# Patient Record
Sex: MALE | Race: BLACK OR AFRICAN AMERICAN | NOT HISPANIC OR LATINO | Employment: FULL TIME | URBAN - NONMETROPOLITAN AREA
[De-identification: names, ages, dates, MRNs, and addresses within clinical notes are randomized per-mention and may not be internally consistent; named-entity substitution may affect disease eponyms.]

---

## 2024-01-22 ENCOUNTER — HOSPITAL ENCOUNTER (EMERGENCY)
Facility: HOSPITAL | Age: 34
Discharge: HOME | End: 2024-01-22

## 2024-01-22 VITALS
RESPIRATION RATE: 16 BRPM | HEART RATE: 82 BPM | SYSTOLIC BLOOD PRESSURE: 135 MMHG | WEIGHT: 221 LBS | DIASTOLIC BLOOD PRESSURE: 89 MMHG | OXYGEN SATURATION: 97 % | TEMPERATURE: 98 F | BODY MASS INDEX: 32.73 KG/M2 | HEIGHT: 69 IN

## 2024-01-22 DIAGNOSIS — T14.90XA INJURY: ICD-10-CM

## 2024-01-22 DIAGNOSIS — S60.011A CONTUSION OF RIGHT THUMB WITHOUT DAMAGE TO NAIL, INITIAL ENCOUNTER: Primary | ICD-10-CM

## 2024-01-22 PROCEDURE — 99284 EMERGENCY DEPT VISIT MOD MDM: CPT | Mod: ,,, | Performed by: NURSE PRACTITIONER

## 2024-01-22 PROCEDURE — 99283 EMERGENCY DEPT VISIT LOW MDM: CPT

## 2024-01-22 RX ORDER — NAPROXEN 500 MG/1
500 TABLET ORAL 2 TIMES DAILY WITH MEALS
Qty: 20 TABLET | Refills: 0 | Status: SHIPPED | OUTPATIENT
Start: 2024-01-22

## 2024-01-22 NOTE — ED PROVIDER NOTES
Encounter Date: 1/22/2024       History     Chief Complaint   Patient presents with    Hand Injury     Closed right thumb in car door approx 30 mins PTA     32 y/o AAM arrived to the ED via POV with complaint of right thumb injury that occurred 30 minutes PTA when car door shut on his thumb. Rates pain 10/10, pain worse with movement. Has not taken any medication for pain.    The history is provided by the patient.     Review of patient's allergies indicates:  No Known Allergies  History reviewed. No pertinent past medical history.  History reviewed. No pertinent surgical history.  Family History   Problem Relation Age of Onset    Diabetes Mother     Diabetes Sister     Diabetes Maternal Grandmother     Diabetes Paternal Grandmother      Social History     Tobacco Use    Smoking status: Never    Smokeless tobacco: Never   Substance Use Topics    Alcohol use: Never    Drug use: Never     Review of Systems   Constitutional: Negative.    HENT: Negative.     Eyes: Negative.    Respiratory: Negative.     Cardiovascular: Negative.    Gastrointestinal: Negative.    Genitourinary: Negative.    Musculoskeletal:  Positive for arthralgias (right thumb).   Skin: Negative.    Neurological: Negative.    Hematological: Negative.    Psychiatric/Behavioral: Negative.         Physical Exam     Initial Vitals [01/22/24 1139]   BP Pulse Resp Temp SpO2   135/89 82 16 97.9 °F (36.6 °C) 97 %      MAP       --         Physical Exam    Nursing note and vitals reviewed.  Constitutional: He appears well-developed and well-nourished.   Cardiovascular:  Normal rate and regular rhythm.           Pulses:       Radial pulses are 2+ on the right side and 2+ on the left side.   Pulmonary/Chest: Effort normal and breath sounds normal.   Musculoskeletal:      Right hand: Tenderness and bony tenderness present. No swelling or deformity. Decreased range of motion. Normal strength. Normal sensation. Normal capillary refill. Normal pulse.      Left hand:  Normal.     Neurological: He is alert and oriented to person, place, and time.   Skin: Skin is warm, dry and intact. Capillary refill takes less than 2 seconds.   Psychiatric: He has a normal mood and affect. His speech is normal and behavior is normal. Thought content normal.         Medical Screening Exam   See Full Note    ED Course   Procedures  Labs Reviewed - No data to display       Imaging Results              X-Ray Hand 3 View Left (Final result)  Result time 01/22/24 12:07:42   Procedure changed from X-Ray Hand 2 View Right     Final result by Abiel Gallagher DO (01/22/24 12:07:42)                   Impression:      As above.    Point of Service: [facility]      Electronically signed by: Abiel Gallagher  Date:    01/22/2024  Time:    12:07               Narrative:    EXAMINATION:  XR HAND COMPLETE 3 VIEW LEFT    CLINICAL HISTORY:  Injury, unspecified, initial encounterinjury pain 1st digit;    COMPARISON:  None    TECHNIQUE:  Frontal, lateral, and oblique views of the right hand.    FINDINGS:  No convincing acute fracture or dislocation demonstrated. No concerning radiopaque foreign body visualized.                                       Medications - No data to display  Medical Decision Making  34 y/o AAM arrived to the ED via POV with complaint of right thumb injury that occurred 30 minutes PTA when car door shut on his thumb. Rates pain 10/10, pain worse with movement. Has not taken any medication for pain.    Amount and/or Complexity of Data Reviewed  Radiology: ordered.     Details: Right hand x-ray: No convincing acute fracture or dislocation demonstrated. No concerning radiopaque foreign body visualized.  Discussion of management or test interpretation with external provider(s): Discharge MDM  I discussed negative x-ray results with patient.  Offered Toradol injection for pain, but patient declined. Prescription sent in for Naproxen to help with pain. Reviewed discharge instructions with  patient.   Patient was discharged in stable condition.  Detailed return precautions discussed. Patient agreed to treatment plan and verbalized understanding.     Risk  Prescription drug management.                                      Clinical Impression:   Final diagnoses:  [T14.90XA] Injury  [S60.011A] Contusion of right thumb without damage to nail, initial encounter (Primary)        ED Disposition Condition    Discharge Stable          ED Prescriptions       Medication Sig Dispense Start Date End Date Auth. Provider    naproxen (NAPROSYN) 500 MG tablet Take 1 tablet (500 mg total) by mouth 2 (two) times daily with meals. 20 tablet 1/22/2024 -- Judy Arriaga FNP          Follow-up Information       Follow up With Specialties Details Why Contact Info    Primary Care Provider  Call  If symptoms worsen              Judy Arriaga FNP  01/22/24 7846

## 2024-01-22 NOTE — DISCHARGE INSTRUCTIONS
-Naproxen 500 mg take one tab by mouth every 12 hours for pain and inflammation.  -Ice pack 10-15 min, then off 20 minutes

## 2024-01-25 ENCOUNTER — TELEPHONE (OUTPATIENT)
Dept: EMERGENCY MEDICINE | Facility: HOSPITAL | Age: 34
End: 2024-01-25

## 2024-03-05 ENCOUNTER — HOSPITAL ENCOUNTER (EMERGENCY)
Facility: HOSPITAL | Age: 34
Discharge: HOME | End: 2024-03-05

## 2024-03-05 VITALS
BODY MASS INDEX: 31.1 KG/M2 | OXYGEN SATURATION: 96 % | TEMPERATURE: 98 F | DIASTOLIC BLOOD PRESSURE: 77 MMHG | SYSTOLIC BLOOD PRESSURE: 118 MMHG | WEIGHT: 210 LBS | HEART RATE: 82 BPM | HEIGHT: 69 IN | RESPIRATION RATE: 16 BRPM

## 2024-03-05 DIAGNOSIS — S60.221A CONTUSION OF RIGHT HAND, INITIAL ENCOUNTER: Primary | ICD-10-CM

## 2024-03-05 PROCEDURE — 99283 EMERGENCY DEPT VISIT LOW MDM: CPT | Mod: 25

## 2024-03-05 PROCEDURE — 99283 EMERGENCY DEPT VISIT LOW MDM: CPT | Mod: ,,, | Performed by: REGISTERED NURSE

## 2024-03-05 NOTE — Clinical Note
"Russell Courtney ([employer]ell) was seen and treated in our emergency department on 3/5/2024.  He may return to work on 03/06/2024.       If you have any questions or concerns, please don't hesitate to call.      Rodolfo Leiva, NP-C"

## 2024-03-06 ENCOUNTER — TELEPHONE (OUTPATIENT)
Dept: EMERGENCY MEDICINE | Facility: HOSPITAL | Age: 34
End: 2024-03-06

## 2024-03-06 NOTE — ED PROVIDER NOTES
Encounter Date: 3/5/2024       History     Chief Complaint   Patient presents with    Hand Injury     Pain in right hand following a jessie falling on his hand while at work at 5:45.  Knuckle of finger of right  hand flattened and patient unable to move middle finger.  Took tylenol x 2 at 6:30 without relief of pain.       33 y-old AAM received to ED with complaint of pain/injury to his right hand. States that a 50 ton jessie fell over on top of his hand while at work approximately 1-2 hours PTA. Pain is worse to his 3rd digit metacarpophalangeal joint with mild swelling to this area. No other injury per patient.       Review of patient's allergies indicates:  No Known Allergies  History reviewed. No pertinent past medical history.  History reviewed. No pertinent surgical history.  Family History   Problem Relation Age of Onset    Diabetes Mother     Diabetes Sister     Diabetes Maternal Grandmother     Diabetes Paternal Grandmother      Social History     Tobacco Use    Smoking status: Never    Smokeless tobacco: Never   Substance Use Topics    Alcohol use: Never    Drug use: Never     Review of Systems   Constitutional: Negative.    HENT: Negative.     Eyes: Negative.    Respiratory: Negative.     Cardiovascular: Negative.    Gastrointestinal: Negative.    Endocrine: Negative.    Genitourinary: Negative.    Musculoskeletal:  Positive for arthralgias and joint swelling.   Skin: Negative.    Allergic/Immunologic: Negative.    Neurological: Negative.    Hematological: Negative.    Psychiatric/Behavioral: Negative.         Physical Exam     Initial Vitals [03/05/24 1930]   BP Pulse Resp Temp SpO2   118/77 82 16 98.2 °F (36.8 °C) 96 %      MAP       --         Physical Exam    Nursing note and vitals reviewed.  Constitutional: He appears well-developed and well-nourished.   HENT:   Head: Normocephalic and atraumatic.   Right Ear: External ear normal.   Left Ear: External ear normal.   Nose: Nose normal.   Mouth/Throat:  Oropharynx is clear and moist.   Eyes: Conjunctivae are normal.   Neck: Neck supple.   Normal range of motion.  Cardiovascular:  Normal rate, regular rhythm, normal heart sounds and intact distal pulses.           Pulmonary/Chest: Breath sounds normal.   Abdominal: Abdomen is soft. Bowel sounds are normal.   Musculoskeletal:         General: Normal range of motion.      Cervical back: Normal range of motion and neck supple.     Neurological: He is alert and oriented to person, place, and time. He has normal strength. GCS score is 15. GCS eye subscore is 4. GCS verbal subscore is 5. GCS motor subscore is 6.   Skin: Skin is warm and dry. Capillary refill takes less than 2 seconds.   Psychiatric: He has a normal mood and affect. His behavior is normal. Judgment and thought content normal.         Medical Screening Exam   See Full Note    ED Course   Procedures  Labs Reviewed - No data to display       Imaging Results              X-Ray Hand 3 view Right (Final result)  Result time 03/05/24 20:45:55      Final result by Kishan Roa MD (03/05/24 20:45:55)                   Impression:      No acute radiographic abnormality.    Place of service: [facility]      Electronically signed by: Kishan Roa  Date:    03/05/2024  Time:    20:45               Narrative:    EXAMINATION:  XR hand complete three views right    CLINICAL HISTORY:  Pain injury    TECHNIQUE:  AP oblique left    COMPARISON:  Femoral prior radiograph 01/22/2024    FINDINGS:  There is no acute osseous, articular or soft tissue abnormality identified.  No radiopaque foreign bodies are identified in the soft tissues.    The visualized joint spaces appear appears relatively preserved.                                       Medications - No data to display  Medical Decision Making  33 y-old AAM received to ED with complaint of pain/injury to his right hand. States that a 50 ton jessie fell over on top of his hand while at work approximately  1-2 hours PTA. Pain is worse to his 3rd digit metacarpophalangeal joint with mild swelling to this area. No other injury per patient.       Amount and/or Complexity of Data Reviewed  Radiology: ordered.     Details: No acute abnormality to right hand x-ray.     Risk  Risk Details: No acute fx per radiologist. DX with hand contusion. Recommend RICE therapy and f/u with PCP. This was explained at length with patient who verbalizes understanding and agrees with plan. Offered Toradol injection for which patient refuses.                                       Clinical Impression:   Final diagnoses:  [S60.221A] Contusion of right hand, initial encounter (Primary)        ED Disposition Condition    Discharge Stable          ED Prescriptions    None       Follow-up Information       Follow up With Specialties Details Why Contact Info    Your Regular Doctor  Schedule an appointment as soon as possible for a visit  As needed              Rodolfo Leiva, IVONNE  03/05/24 2051       Rodolfo Leiva NP-C  03/05/24 2052

## 2024-03-06 NOTE — DISCHARGE INSTRUCTIONS
May alternate tylenol and motrin every 6 hours as needed for pain. Rest, Ice (on for 15-20 min, off for 2-3 hours), Compression (splint) and keep elevated at night.